# Patient Record
Sex: FEMALE | Race: WHITE
[De-identification: names, ages, dates, MRNs, and addresses within clinical notes are randomized per-mention and may not be internally consistent; named-entity substitution may affect disease eponyms.]

---

## 2019-06-23 ENCOUNTER — HOSPITAL ENCOUNTER (EMERGENCY)
Dept: HOSPITAL 26 - MED | Age: 24
Discharge: HOME | End: 2019-06-23
Payer: MEDICAID

## 2019-06-23 VITALS — DIASTOLIC BLOOD PRESSURE: 75 MMHG | SYSTOLIC BLOOD PRESSURE: 155 MMHG

## 2019-06-23 VITALS — SYSTOLIC BLOOD PRESSURE: 155 MMHG | DIASTOLIC BLOOD PRESSURE: 75 MMHG

## 2019-06-23 VITALS — HEIGHT: 64 IN | WEIGHT: 150 LBS | BODY MASS INDEX: 25.61 KG/M2
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
## 2019-06-23 DIAGNOSIS — Z02.89: Primary | ICD-10-CM
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
## 2019-06-23 NOTE — NUR
PATIENT BIB [facility]. PATIENT EXAMINED BY DR. HOLLAND. PATIENT MEDICALLY CLEARED AND 
RELEASED IN CUSTODY IN STABLE CONDITION. ORIGINAL PRE-BOOK FORM GIVEN TO 
OFFICER [facility] #[id number].
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
